# Patient Record
(demographics unavailable — no encounter records)

---

## 2025-05-09 NOTE — HISTORY OF PRESENT ILLNESS
[de-identified] : 40-year-old female status post laparoscopic appendectomy for acute appendicitis.  Pathology demonstrates same.  Patient is doing well.  Incisions are healing nicely.  All her questions are answered.  She is tolerating diet and having no GI disturbance.  All her questions were answered.  Follow-up as needed.